# Patient Record
Sex: FEMALE
[De-identification: names, ages, dates, MRNs, and addresses within clinical notes are randomized per-mention and may not be internally consistent; named-entity substitution may affect disease eponyms.]

---

## 2019-11-18 ENCOUNTER — RX ONLY (OUTPATIENT)
Age: 54
Setting detail: RX ONLY
End: 2019-11-18

## 2019-11-18 ENCOUNTER — APPOINTMENT (RX ONLY)
Dept: URBAN - METROPOLITAN AREA CLINIC 77 | Facility: CLINIC | Age: 54
Setting detail: DERMATOLOGY
End: 2019-11-18

## 2019-11-18 DIAGNOSIS — L20.89 OTHER ATOPIC DERMATITIS: ICD-10-CM

## 2019-11-18 DIAGNOSIS — Z48.817 ENCOUNTER FOR SURGICAL AFTERCARE FOLLOWING SURGERY ON THE SKIN AND SUBCUTANEOUS TISSUE: ICD-10-CM

## 2019-11-18 PROBLEM — L20.84 INTRINSIC (ALLERGIC) ECZEMA: Status: ACTIVE | Noted: 2019-11-18

## 2019-11-18 PROCEDURE — ? COUNSELING

## 2019-11-18 PROCEDURE — ? PRESCRIPTION

## 2019-11-18 PROCEDURE — ? TREATMENT REGIMEN

## 2019-11-18 PROCEDURE — ? SUTURE REMOVAL (GLOBAL PERIOD)

## 2019-11-18 PROCEDURE — 99213 OFFICE O/P EST LOW 20 MIN: CPT

## 2019-11-18 RX ORDER — FLUTICASONE PROPIONATE 0.5 MG/G
CREAM TOPICAL
Qty: 1 | Refills: 1 | Status: CANCELLED | COMMUNITY
Start: 2019-11-18

## 2019-11-18 RX ORDER — FLUTICASONE PROPIONATE 0.5 MG/G
CREAM TOPICAL
Qty: 1 | Refills: 3 | Status: ERX | COMMUNITY
Start: 2019-11-18

## 2019-11-18 RX ADMIN — FLUTICASONE PROPIONATE: 0.5 CREAM TOPICAL at 00:00

## 2019-11-18 ASSESSMENT — LOCATION SIMPLE DESCRIPTION DERM
LOCATION SIMPLE: RIGHT LOWER BACK
LOCATION SIMPLE: LEFT UPPER BACK
LOCATION SIMPLE: ABDOMEN
LOCATION SIMPLE: RIGHT EYEBROW

## 2019-11-18 ASSESSMENT — LOCATION DETAILED DESCRIPTION DERM
LOCATION DETAILED: LEFT SUPERIOR MEDIAL UPPER BACK
LOCATION DETAILED: RIGHT LATERAL EYEBROW
LOCATION DETAILED: RIGHT INFERIOR LATERAL MIDBACK
LOCATION DETAILED: RIGHT SUPERIOR LATERAL LOWER BACK
LOCATION DETAILED: PERIUMBILICAL SKIN

## 2019-11-18 ASSESSMENT — LOCATION ZONE DERM
LOCATION ZONE: TRUNK
LOCATION ZONE: FACE

## 2019-12-04 ENCOUNTER — RX ONLY (OUTPATIENT)
Age: 54
Setting detail: RX ONLY
End: 2019-12-04

## 2019-12-04 RX ORDER — CALCIPOTRIENE 50 UG/G
1 APPLICATION AEROSOL, FOAM TOPICAL BID
Qty: 90 | Refills: 3 | Status: CANCELLED
Stop reason: CLARIF

## 2020-09-16 ENCOUNTER — APPOINTMENT (RX ONLY)
Dept: URBAN - METROPOLITAN AREA CLINIC 77 | Facility: CLINIC | Age: 55
Setting detail: DERMATOLOGY
End: 2020-09-16

## 2020-09-16 DIAGNOSIS — L40.0 PSORIASIS VULGARIS: ICD-10-CM

## 2020-09-16 DIAGNOSIS — L90.8 OTHER ATROPHIC DISORDERS OF SKIN: ICD-10-CM

## 2020-09-16 DIAGNOSIS — L65.9 NONSCARRING HAIR LOSS, UNSPECIFIED: ICD-10-CM

## 2020-09-16 DIAGNOSIS — D49.2 NEOPLASM OF UNSPECIFIED BEHAVIOR OF BONE, SOFT TISSUE, AND SKIN: ICD-10-CM

## 2020-09-16 DIAGNOSIS — D22 MELANOCYTIC NEVI: ICD-10-CM

## 2020-09-16 DIAGNOSIS — L81.4 OTHER MELANIN HYPERPIGMENTATION: ICD-10-CM

## 2020-09-16 DIAGNOSIS — L82.1 OTHER SEBORRHEIC KERATOSIS: ICD-10-CM

## 2020-09-16 DIAGNOSIS — D18.0 HEMANGIOMA: ICD-10-CM

## 2020-09-16 PROBLEM — D22.5 MELANOCYTIC NEVI OF TRUNK: Status: ACTIVE | Noted: 2020-09-16

## 2020-09-16 PROBLEM — D18.01 HEMANGIOMA OF SKIN AND SUBCUTANEOUS TISSUE: Status: ACTIVE | Noted: 2020-09-16

## 2020-09-16 PROBLEM — Z85.3 PERSONAL HISTORY OF MALIGNANT NEOPLASM OF BREAST: Status: ACTIVE | Noted: 2020-09-16

## 2020-09-16 PROCEDURE — ? COUNSELING

## 2020-09-16 PROCEDURE — 99214 OFFICE O/P EST MOD 30 MIN: CPT

## 2020-09-16 PROCEDURE — ? RECOMMENDATIONS

## 2020-09-16 PROCEDURE — ? OBSERVATION AND MEASURE

## 2020-09-16 PROCEDURE — ? DEFER

## 2020-09-16 PROCEDURE — ? MEDICATION COUNSELING

## 2020-09-16 PROCEDURE — ? PRESCRIPTION

## 2020-09-16 RX ORDER — HYDROCORTISONE 25 MG/G
CREAM TOPICAL
Qty: 1 | Refills: 3 | Status: ERX | COMMUNITY
Start: 2020-09-16

## 2020-09-16 RX ORDER — BIMATOPROST 0.3 MG/ML
SOLUTION/ DROPS OPHTHALMIC
Qty: 1 | Refills: 3 | Status: ERX | COMMUNITY
Start: 2020-09-16

## 2020-09-16 RX ADMIN — BIMATOPROST: 0.3 SOLUTION/ DROPS OPHTHALMIC at 00:00

## 2020-09-16 RX ADMIN — HYDROCORTISONE: 25 CREAM TOPICAL at 00:00

## 2020-09-16 ASSESSMENT — LOCATION DETAILED DESCRIPTION DERM
LOCATION DETAILED: RIGHT INFERIOR POSTERIOR HELIX
LOCATION DETAILED: SUBXIPHOID
LOCATION DETAILED: NASAL DORSUM
LOCATION DETAILED: LEFT SUPERIOR MEDIAL FOREHEAD
LOCATION DETAILED: RIGHT MEDIAL BUTTOCK
LOCATION DETAILED: LEFT CENTRAL EYEBROW
LOCATION DETAILED: RIGHT MEDIAL EYEBROW
LOCATION DETAILED: LEFT POSTAURICULAR SKIN
LOCATION DETAILED: RIGHT MEDIAL MALAR CHEEK
LOCATION DETAILED: LEFT CENTRAL MALAR CHEEK
LOCATION DETAILED: LEFT ANTERIOR DISTAL THIGH
LOCATION DETAILED: RIGHT DORSAL WRIST

## 2020-09-16 ASSESSMENT — LOCATION ZONE DERM
LOCATION ZONE: ARM
LOCATION ZONE: TRUNK
LOCATION ZONE: FACE
LOCATION ZONE: EAR
LOCATION ZONE: LEG
LOCATION ZONE: SCALP
LOCATION ZONE: NOSE

## 2020-09-16 ASSESSMENT — LOCATION SIMPLE DESCRIPTION DERM
LOCATION SIMPLE: RIGHT EYEBROW
LOCATION SIMPLE: ABDOMEN
LOCATION SIMPLE: RIGHT CHEEK
LOCATION SIMPLE: RIGHT EAR
LOCATION SIMPLE: LEFT THIGH
LOCATION SIMPLE: NOSE
LOCATION SIMPLE: POSTERIOR SCALP
LOCATION SIMPLE: RIGHT WRIST
LOCATION SIMPLE: LEFT EYEBROW
LOCATION SIMPLE: LEFT FOREHEAD
LOCATION SIMPLE: RIGHT BUTTOCK
LOCATION SIMPLE: LEFT CHEEK

## 2020-09-16 ASSESSMENT — BSA PSORIASIS: % BODY COVERED IN PSORIASIS: 1

## 2021-03-01 ENCOUNTER — RX ONLY (OUTPATIENT)
Age: 56
Setting detail: RX ONLY
End: 2021-03-01

## 2021-03-01 RX ORDER — TRETIONIN 1 MG/G
CREAM TOPICAL
Qty: 1 | Refills: 3 | Status: ERX | COMMUNITY
Start: 2021-03-01

## 2022-11-03 ENCOUNTER — APPOINTMENT (RX ONLY)
Dept: URBAN - METROPOLITAN AREA CLINIC 76 | Facility: CLINIC | Age: 57
Setting detail: DERMATOLOGY
End: 2022-11-03

## 2022-11-03 DIAGNOSIS — D22 MELANOCYTIC NEVI: ICD-10-CM | Status: STABLE

## 2022-11-03 DIAGNOSIS — L40.0 PSORIASIS VULGARIS: ICD-10-CM | Status: WELL CONTROLLED

## 2022-11-03 DIAGNOSIS — D18.0 HEMANGIOMA: ICD-10-CM

## 2022-11-03 DIAGNOSIS — L70.0 ACNE VULGARIS: ICD-10-CM

## 2022-11-03 DIAGNOSIS — Z71.89 OTHER SPECIFIED COUNSELING: ICD-10-CM

## 2022-11-03 DIAGNOSIS — L81.4 OTHER MELANIN HYPERPIGMENTATION: ICD-10-CM

## 2022-11-03 DIAGNOSIS — L82.1 OTHER SEBORRHEIC KERATOSIS: ICD-10-CM

## 2022-11-03 PROBLEM — D22.5 MELANOCYTIC NEVI OF TRUNK: Status: ACTIVE | Noted: 2022-11-03

## 2022-11-03 PROBLEM — D18.01 HEMANGIOMA OF SKIN AND SUBCUTANEOUS TISSUE: Status: ACTIVE | Noted: 2022-11-03

## 2022-11-03 PROCEDURE — ? SUNSCREEN RECOMMENDATIONS

## 2022-11-03 PROCEDURE — ? PRESCRIPTION

## 2022-11-03 PROCEDURE — ? OBSERVATION AND MEASURE

## 2022-11-03 PROCEDURE — 99214 OFFICE O/P EST MOD 30 MIN: CPT

## 2022-11-03 PROCEDURE — ? TREATMENT REGIMEN

## 2022-11-03 PROCEDURE — ? COUNSELING

## 2022-11-03 RX ORDER — TRETIONIN 1 MG/G
CREAM TOPICAL
Qty: 20 | Refills: 3 | Status: ERX | COMMUNITY
Start: 2022-11-03

## 2022-11-03 RX ADMIN — TRETIONIN: 1 CREAM TOPICAL at 00:00

## 2022-11-03 ASSESSMENT — LOCATION ZONE DERM
LOCATION ZONE: NOSE
LOCATION ZONE: FACE
LOCATION ZONE: LEG
LOCATION ZONE: EAR
LOCATION ZONE: TRUNK
LOCATION ZONE: SCALP

## 2022-11-03 ASSESSMENT — LOCATION SIMPLE DESCRIPTION DERM
LOCATION SIMPLE: POSTERIOR SCALP
LOCATION SIMPLE: LEFT FOREHEAD
LOCATION SIMPLE: RIGHT EAR
LOCATION SIMPLE: LEFT THIGH
LOCATION SIMPLE: RIGHT CHEEK
LOCATION SIMPLE: LEFT CHEEK
LOCATION SIMPLE: NOSE
LOCATION SIMPLE: RIGHT BUTTOCK
LOCATION SIMPLE: ABDOMEN

## 2022-11-03 ASSESSMENT — LOCATION DETAILED DESCRIPTION DERM
LOCATION DETAILED: LEFT CENTRAL MALAR CHEEK
LOCATION DETAILED: LEFT POSTAURICULAR SKIN
LOCATION DETAILED: RIGHT INFERIOR POSTERIOR HELIX
LOCATION DETAILED: LEFT ANTERIOR DISTAL THIGH
LOCATION DETAILED: SUBXIPHOID
LOCATION DETAILED: RIGHT MEDIAL MALAR CHEEK
LOCATION DETAILED: LEFT SUPERIOR MEDIAL FOREHEAD
LOCATION DETAILED: NASAL DORSUM
LOCATION DETAILED: RIGHT MEDIAL BUTTOCK

## 2022-11-03 ASSESSMENT — SEVERITY ASSESSMENT OVERALL AMONG ALL PATIENTS
IN YOUR EXPERIENCE, AMONG ALL PATIENTS YOU HAVE SEEN WITH THIS CONDITION, HOW SEVERE IS THIS PATIENT'S CONDITION?: ALMOST CLEAR

## 2022-11-03 ASSESSMENT — BSA PSORIASIS: % BODY COVERED IN PSORIASIS: 1

## 2023-11-30 ENCOUNTER — RX ONLY (OUTPATIENT)
Age: 58
Setting detail: RX ONLY
End: 2023-11-30

## 2023-11-30 ENCOUNTER — APPOINTMENT (RX ONLY)
Dept: URBAN - METROPOLITAN AREA CLINIC 76 | Facility: CLINIC | Age: 58
Setting detail: DERMATOLOGY
End: 2023-11-30

## 2023-11-30 DIAGNOSIS — L81.4 OTHER MELANIN HYPERPIGMENTATION: ICD-10-CM

## 2023-11-30 DIAGNOSIS — D18.0 HEMANGIOMA: ICD-10-CM

## 2023-11-30 DIAGNOSIS — D22 MELANOCYTIC NEVI: ICD-10-CM

## 2023-11-30 DIAGNOSIS — L82.0 INFLAMED SEBORRHEIC KERATOSIS: ICD-10-CM

## 2023-11-30 DIAGNOSIS — L82.1 OTHER SEBORRHEIC KERATOSIS: ICD-10-CM

## 2023-11-30 DIAGNOSIS — Z71.89 OTHER SPECIFIED COUNSELING: ICD-10-CM

## 2023-11-30 PROBLEM — D18.01 HEMANGIOMA OF SKIN AND SUBCUTANEOUS TISSUE: Status: ACTIVE | Noted: 2023-11-30

## 2023-11-30 PROBLEM — D22.5 MELANOCYTIC NEVI OF TRUNK: Status: ACTIVE | Noted: 2023-11-30

## 2023-11-30 PROCEDURE — ? OBSERVATION AND MEASURE

## 2023-11-30 PROCEDURE — 17110 DESTRUCTION B9 LES UP TO 14: CPT

## 2023-11-30 PROCEDURE — ? LIQUID NITROGEN

## 2023-11-30 PROCEDURE — ? SUNSCREEN RECOMMENDATIONS

## 2023-11-30 PROCEDURE — ? COUNSELING

## 2023-11-30 PROCEDURE — 99213 OFFICE O/P EST LOW 20 MIN: CPT | Mod: 25

## 2023-11-30 RX ORDER — TRETIONIN 1 MG/G
CREAM TOPICAL
Qty: 20 | Refills: 3 | Status: ERX

## 2023-11-30 ASSESSMENT — LOCATION ZONE DERM
LOCATION ZONE: FACE
LOCATION ZONE: NOSE
LOCATION ZONE: LEG
LOCATION ZONE: TRUNK

## 2023-11-30 ASSESSMENT — LOCATION DETAILED DESCRIPTION DERM
LOCATION DETAILED: NASAL DORSUM
LOCATION DETAILED: SUBXIPHOID
LOCATION DETAILED: LEFT CENTRAL MALAR CHEEK
LOCATION DETAILED: RIGHT MEDIAL MALAR CHEEK
LOCATION DETAILED: LEFT ANTERIOR DISTAL THIGH
LOCATION DETAILED: RIGHT BUTTOCK
LOCATION DETAILED: RIGHT LATERAL EYEBROW

## 2023-11-30 ASSESSMENT — LOCATION SIMPLE DESCRIPTION DERM
LOCATION SIMPLE: ABDOMEN
LOCATION SIMPLE: LEFT THIGH
LOCATION SIMPLE: RIGHT CHEEK
LOCATION SIMPLE: RIGHT BUTTOCK
LOCATION SIMPLE: RIGHT EYEBROW
LOCATION SIMPLE: NOSE
LOCATION SIMPLE: LEFT CHEEK

## 2024-12-04 ENCOUNTER — APPOINTMENT (OUTPATIENT)
Dept: URBAN - METROPOLITAN AREA CLINIC 76 | Facility: CLINIC | Age: 59
Setting detail: DERMATOLOGY
End: 2024-12-04

## 2024-12-04 DIAGNOSIS — L259 CONTACT DERMATITIS AND OTHER ECZEMA, UNSPECIFIED CAUSE: ICD-10-CM | Status: STABLE

## 2024-12-04 DIAGNOSIS — L81.4 OTHER MELANIN HYPERPIGMENTATION: ICD-10-CM

## 2024-12-04 DIAGNOSIS — D18.0 HEMANGIOMA: ICD-10-CM

## 2024-12-04 DIAGNOSIS — Z71.89 OTHER SPECIFIED COUNSELING: ICD-10-CM

## 2024-12-04 DIAGNOSIS — D22 MELANOCYTIC NEVI: ICD-10-CM | Status: STABLE

## 2024-12-04 DIAGNOSIS — I78.8 OTHER DISEASES OF CAPILLARIES: ICD-10-CM

## 2024-12-04 DIAGNOSIS — L82.1 OTHER SEBORRHEIC KERATOSIS: ICD-10-CM

## 2024-12-04 PROBLEM — D18.01 HEMANGIOMA OF SKIN AND SUBCUTANEOUS TISSUE: Status: ACTIVE | Noted: 2024-12-04

## 2024-12-04 PROBLEM — D22.5 MELANOCYTIC NEVI OF TRUNK: Status: ACTIVE | Noted: 2024-12-04

## 2024-12-04 PROBLEM — L23.9 ALLERGIC CONTACT DERMATITIS, UNSPECIFIED CAUSE: Status: ACTIVE | Noted: 2024-12-04

## 2024-12-04 PROCEDURE — ? TREATMENT REGIMEN

## 2024-12-04 PROCEDURE — ? COUNSELING

## 2024-12-04 PROCEDURE — ? OBSERVATION AND MEASURE

## 2024-12-04 PROCEDURE — ? ORDER FOR PATCH TESTING

## 2024-12-04 PROCEDURE — 99213 OFFICE O/P EST LOW 20 MIN: CPT

## 2024-12-04 ASSESSMENT — LOCATION ZONE DERM
LOCATION ZONE: LEG
LOCATION ZONE: FACE
LOCATION ZONE: TRUNK
LOCATION ZONE: NOSE

## 2024-12-04 ASSESSMENT — LOCATION DETAILED DESCRIPTION DERM
LOCATION DETAILED: LEFT ANTERIOR DISTAL THIGH
LOCATION DETAILED: NASAL DORSUM
LOCATION DETAILED: LEFT CENTRAL MALAR CHEEK
LOCATION DETAILED: RIGHT INFERIOR CENTRAL MALAR CHEEK
LOCATION DETAILED: LEFT MEDIAL BREAST 11-12:00 REGION
LOCATION DETAILED: RIGHT BUTTOCK
LOCATION DETAILED: SUBXIPHOID
LOCATION DETAILED: RIGHT MEDIAL MALAR CHEEK

## 2024-12-04 ASSESSMENT — LOCATION SIMPLE DESCRIPTION DERM
LOCATION SIMPLE: ABDOMEN
LOCATION SIMPLE: NOSE
LOCATION SIMPLE: RIGHT BUTTOCK
LOCATION SIMPLE: LEFT CHEEK
LOCATION SIMPLE: RIGHT CHEEK
LOCATION SIMPLE: LEFT BREAST
LOCATION SIMPLE: LEFT THIGH

## 2024-12-04 ASSESSMENT — SEVERITY ASSESSMENT: SEVERITY: CLEAR

## 2024-12-04 NOTE — PROCEDURE: ORDER FOR PATCH TESTING
Counseling: I discussed the timing of the procedure and ensured the patient understands that this test requires multiple visits. No topical steroids applied should be applied to the patch testing location and no oral prednisone for two week prior to the test. While the patches are in place they should be kept dry which will limit bathing, swimming an exercise. I also explained that it is common for testing to be negative and this doesn't mean there isn't a allergic reaction occurring. During the testing itching is common.
Patch Test Reading Schedule: First Reading at 48 hours and Second Reading at 72 hours
Patch Test To Be Applied: [employer] 80 Series
Detail Level: Simple
Location Patches Should Be Applied: Back

## 2024-12-04 NOTE — HPI: SECONDARY COMPLAINT
How Severe Is This Condition?: moderate
Additional History: Recent history severe facial rash when using makeup.

## 2024-12-16 ENCOUNTER — APPOINTMENT (OUTPATIENT)
Dept: URBAN - METROPOLITAN AREA CLINIC 76 | Facility: CLINIC | Age: 59
Setting detail: DERMATOLOGY
End: 2024-12-16

## 2024-12-16 DIAGNOSIS — L23.9 ALLERGIC CONTACT DERMATITIS, UNSPECIFIED CAUSE: ICD-10-CM

## 2024-12-16 PROCEDURE — 95044 PATCH/APPLICATION TESTS: CPT

## 2024-12-16 PROCEDURE — ? PATCH TESTING

## 2024-12-16 ASSESSMENT — LOCATION SIMPLE DESCRIPTION DERM: LOCATION SIMPLE: UPPER BACK

## 2024-12-16 ASSESSMENT — LOCATION ZONE DERM: LOCATION ZONE: TRUNK

## 2024-12-16 ASSESSMENT — LOCATION DETAILED DESCRIPTION DERM: LOCATION DETAILED: INFERIOR THORACIC SPINE

## 2024-12-18 ENCOUNTER — APPOINTMENT (OUTPATIENT)
Dept: URBAN - METROPOLITAN AREA CLINIC 76 | Facility: CLINIC | Age: 59
Setting detail: DERMATOLOGY
End: 2024-12-18

## 2024-12-18 DIAGNOSIS — L23.9 ALLERGIC CONTACT DERMATITIS, UNSPECIFIED CAUSE: ICD-10-CM

## 2024-12-18 PROCEDURE — ? NORTH AMERICAN 80 PATCH TEST READING

## 2024-12-18 PROCEDURE — ? TREATMENT REGIMEN

## 2024-12-18 PROCEDURE — 99213 OFFICE O/P EST LOW 20 MIN: CPT

## 2024-12-18 NOTE — PROCEDURE: [employer] 80 PATCH TEST READING
Name Of Allergen 3: Colophonium / (Colophony)
Name Of Allergen 68: Fusidic acid sodium salt
Allergen 35 Reaction: no reaction
Name Of Allergen 18: Potassium dichromate
Name Of Allergen 37: 2-tert-Butyl-4-methoxyphenol (BHA)
Name Of Allergen 38: Gold(I)sodium thiosulfate dihydrate
Name Of Allergen 46: Cocamide JENNIFER / (Coconut diethanolamide)
Name Of Allergen 32: Thimerosal (Merthiolate)
Name Of Allergen 43: Cobalt(II) chloride hexahydrate
Name Of Allergen 15: 0-qocu-Nkbqwkrprzkfgcgvucswwdp resin
Name Of Allergen 27: Methyldibromo glutaronitrile (MDBGN)
Name Of Allergen 51: Disperse Yellow 3
Name Of Allergen 71: Isoamyl-p-methoxycinnamate
Name Of Allergen 31: Sesquiterpene lactone mix
Name Of Allergen 52: Benzyl salicylate
Name Of Allergen 39: Ethyl acrylate
Name Of Allergen 47: Triethanolamine
Name Of Allergen 36: Ethyleneurea, melamine formaldehyde mix
Show Allergen Counseling In The Note?: No
Name Of Allergen 4: P-Phenylenediamine (PPD
Name Of Allergen 2: 2-Mercaptobenzothiazole (MBT)
Name Of Allergen 23: Bacitracin
Name Of Allergen 17: N,N-Diphenylguanidine
Name Of Allergen 28: Fragrance mix
Name Of Allergen 45: B-033A Budesonide
Name Of Allergen 70: Benzoyl Peroxide
Name Of Allergen 63: Iodopropynyl butyl carbamate
Name Of Allergen 9: Neomycin sulfate
Name Of Allergen 58: Benzyl alcohol
Name Of Allergen 33: Propolis
Name Of Allergen 80: Oleamidopropyl Dimethylamine
Show Negative Results In The Note?: Yes
Name Of Allergen 35: Chloroxylenol (PCMX) / (4-Chloro-3,5-xylenol (PCMX))
Name Of Allergen 59: Isopropyl myristate
Name Of Allergen 53: Decyl Glucoside
Name Of Allergen 74: Hydroperoxides of Linalool
Name Of Allergen 26: Paraben Mix
What Reading Time Point?: 48 hour
Name Of Allergen 44: Tixocortol-21-pivalate
Name Of Allergen 29: Glutaral / (Glutaraldehyde)
Name Of Allergen 61: Desoximetasone
Name Of Allergen 14: Quaternium-15 (Dowicil 200) / (1-(3-Chloroallyl)-3,5,9-llqbxi-5-azoniaadamantane chloride)
Name Of Allergen 22: Tocopherol/ (DL alpha tocopherol)
Name Of Allergen 40: Glyceryl monothioglycolate (GMTG)
Name Of Allergen 79: Propylene glycol
Name Of Allergen 30: 2-Bromo-2-nitropropane-l,3-diol (Bronopol)
Detail Level: Zone
Name Of Allergen 54: Methylisothiazolinone
Name Of Allergen 76: Cocamidopropylbetaine
Name Of Allergen 57: Cananga odorata oil / (Ylang-Ylang oil)
Name Of Allergen 16: Mercapto mix
Name Of Allergen 25: Disperse Orange 3
Name Of Allergen 11: Clobetasol-17-propionate
Name Of Allergen 6: Cinnamal / (Cinnamic aldehyde)
Name Of Allergen 48: Textile dye mix
Name Of Allergen 65: Disperse Blue 106/124 Mix
Name Of Allergen 78: Methylisothiazolinone + Methylchloroisothiazolinone / (Cl+-Meisothiazolinone (Agnes CG 200ppm))
Name Of Allergen 20: Nickelsulfate hexahydrate
Name Of Allergen 62: Polysorbate 80 / (Polyoxyethylenesorbitanmonooleate (Tween 80))
Name Of Allergen 13: Epoxy resin Bisphenol A
Name Of Allergen 8: Carba mix
Name Of Allergen 21: Diazolidinylurea (Germall II)
Name Of Allergen 60: Hydroperoxides of Limonene
Name Of Allergen 77: Formaldehyde
Name Of Allergen 12: Ethylenediamine dihydrochloride
Name Of Allergen 1: Benzocaine
Name Of Allergen 56: DMDM Hydantoin
Name Of Allergen 66: Compositae Mix II
Name Of Allergen 75: Amidoamine
Name Of Allergen 72: Hydroxyisohexyl 3-CyclohexeneCarboxaldehyde (Lyral)
Name Of Allergen 10: Thiuram mix
Name Of Allergen 19: Myroxylon pereirae resin / (Balsam [place])
Name Of Allergen 41: Toluenesulfonamide formaldehyde resin
Name Of Allergen 7: Amerchol L 101
Name Of Allergen 49: Tea Tree Oil
Name Of Allergen 69: Dibucaine hydrochloride
Name Of Allergen 42: Methyl methacrylate
Name Of Allergen 73: Ethylhexyl Salicylate
Name Of Allergen 50: Fragrance Mix II
Name Of Allergen 5: Imidazolidinyl urea Germall 115)
Name Of Allergen 64: 2-n-Octyl-4-isothiazolin-3-one
Name Of Allergen 24: Mixed dialkyl thiourea
Number Of Patches Read: 77
Name Of Allergen 34: Benzophenone-3 / (2-Hydroxy-4-methoxybenzophenone)
Name Of Allergen 67: Lidocaine
Name Of Allergen 55: HEMA (2-Hydroxyethyl methacrylate)

## 2024-12-20 ENCOUNTER — APPOINTMENT (OUTPATIENT)
Dept: URBAN - METROPOLITAN AREA CLINIC 76 | Facility: CLINIC | Age: 59
Setting detail: DERMATOLOGY
End: 2024-12-20

## 2024-12-20 DIAGNOSIS — L23.9 ALLERGIC CONTACT DERMATITIS, UNSPECIFIED CAUSE: ICD-10-CM

## 2024-12-20 PROCEDURE — 99213 OFFICE O/P EST LOW 20 MIN: CPT

## 2024-12-20 PROCEDURE — ? TREATMENT REGIMEN

## 2024-12-20 PROCEDURE — ? NORTH AMERICAN 80 PATCH TEST READING

## 2024-12-20 NOTE — PROCEDURE: [employer] 80 PATCH TEST READING
Name Of Allergen 3: Colophonium / (Colophony)
Name Of Allergen 68: Fusidic acid sodium salt
Allergen 35 Reaction: no reaction
Name Of Allergen 18: Potassium dichromate
Name Of Allergen 37: 2-tert-Butyl-4-methoxyphenol (BHA)
Name Of Allergen 38: Gold(I)sodium thiosulfate dihydrate
Name Of Allergen 46: Cocamide JENNIFER / (Coconut diethanolamide)
Name Of Allergen 32: Thimerosal (Merthiolate)
Name Of Allergen 43: Cobalt(II) chloride hexahydrate
Name Of Allergen 15: 5-yhtf-Lvaagtcbxwwfggrdizgcxwy resin
Name Of Allergen 27: Methyldibromo glutaronitrile (MDBGN)
Name Of Allergen 51: Disperse Yellow 3
Name Of Allergen 71: Isoamyl-p-methoxycinnamate
Name Of Allergen 31: Sesquiterpene lactone mix
Name Of Allergen 52: Benzyl salicylate
Name Of Allergen 39: Ethyl acrylate
Name Of Allergen 47: Triethanolamine
Name Of Allergen 36: Ethyleneurea, melamine formaldehyde mix
Show Allergen Counseling In The Note?: No
Name Of Allergen 4: P-Phenylenediamine (PPD
Name Of Allergen 2: 2-Mercaptobenzothiazole (MBT)
Name Of Allergen 23: Bacitracin
Name Of Allergen 17: N,N-Diphenylguanidine
Name Of Allergen 28: Fragrance mix
Name Of Allergen 45: B-033A Budesonide
Name Of Allergen 70: Benzoyl Peroxide
Name Of Allergen 63: Iodopropynyl butyl carbamate
Name Of Allergen 9: Neomycin sulfate
Name Of Allergen 58: Benzyl alcohol
Name Of Allergen 33: Propolis
Name Of Allergen 80: Oleamidopropyl Dimethylamine
Show Negative Results In The Note?: Yes
Name Of Allergen 35: Chloroxylenol (PCMX) / (4-Chloro-3,5-xylenol (PCMX))
Name Of Allergen 59: Isopropyl myristate
Name Of Allergen 53: Decyl Glucoside
Name Of Allergen 74: Hydroperoxides of Linalool
Name Of Allergen 26: Paraben Mix
What Reading Time Point?: 48 hour
Name Of Allergen 44: Tixocortol-21-pivalate
Name Of Allergen 29: Glutaral / (Glutaraldehyde)
Name Of Allergen 61: Desoximetasone
Name Of Allergen 14: Quaternium-15 (Dowicil 200) / (1-(3-Chloroallyl)-3,5,4-npvmhn-3-azoniaadamantane chloride)
Name Of Allergen 22: Tocopherol/ (DL alpha tocopherol)
Name Of Allergen 40: Glyceryl monothioglycolate (GMTG)
Name Of Allergen 79: Propylene glycol
Name Of Allergen 30: 2-Bromo-2-nitropropane-l,3-diol (Bronopol)
Detail Level: Zone
Name Of Allergen 54: Methylisothiazolinone
Name Of Allergen 76: Cocamidopropylbetaine
Name Of Allergen 57: Cananga odorata oil / (Ylang-Ylang oil)
Name Of Allergen 16: Mercapto mix
Name Of Allergen 25: Disperse Orange 3
Name Of Allergen 11: Clobetasol-17-propionate
Name Of Allergen 6: Cinnamal / (Cinnamic aldehyde)
Name Of Allergen 48: Textile dye mix
Name Of Allergen 65: Disperse Blue 106/124 Mix
Name Of Allergen 78: Methylisothiazolinone + Methylchloroisothiazolinone / (Cl+-Meisothiazolinone (Agnes CG 200ppm))
Name Of Allergen 20: Nickelsulfate hexahydrate
Name Of Allergen 62: Polysorbate 80 / (Polyoxyethylenesorbitanmonooleate (Tween 80))
Name Of Allergen 13: Epoxy resin Bisphenol A
Name Of Allergen 8: Carba mix
Name Of Allergen 21: Diazolidinylurea (Germall II)
Name Of Allergen 60: Hydroperoxides of Limonene
Name Of Allergen 77: Formaldehyde
Name Of Allergen 12: Ethylenediamine dihydrochloride
Name Of Allergen 1: Benzocaine
Name Of Allergen 56: DMDM Hydantoin
Name Of Allergen 66: Compositae Mix II
Name Of Allergen 75: Amidoamine
Name Of Allergen 72: Hydroxyisohexyl 3-CyclohexeneCarboxaldehyde (Lyral)
Name Of Allergen 10: Thiuram mix
Name Of Allergen 19: Myroxylon pereirae resin / (Balsam [place])
Name Of Allergen 41: Toluenesulfonamide formaldehyde resin
Name Of Allergen 7: Amerchol L 101
Name Of Allergen 49: Tea Tree Oil
Name Of Allergen 69: Dibucaine hydrochloride
Name Of Allergen 42: Methyl methacrylate
Name Of Allergen 73: Ethylhexyl Salicylate
Name Of Allergen 50: Fragrance Mix II
Name Of Allergen 5: Imidazolidinyl urea Germall 115)
Name Of Allergen 64: 2-n-Octyl-4-isothiazolin-3-one
Name Of Allergen 24: Mixed dialkyl thiourea
Number Of Patches Read: 77
Name Of Allergen 34: Benzophenone-3 / (2-Hydroxy-4-methoxybenzophenone)
Name Of Allergen 67: Lidocaine
Name Of Allergen 55: HEMA (2-Hydroxyethyl methacrylate)